# Patient Record
Sex: FEMALE | Race: WHITE | Employment: FULL TIME | URBAN - METROPOLITAN AREA
[De-identification: names, ages, dates, MRNs, and addresses within clinical notes are randomized per-mention and may not be internally consistent; named-entity substitution may affect disease eponyms.]

---

## 2018-07-18 ENCOUNTER — OFFICE VISIT (OUTPATIENT)
Dept: URGENT CARE | Age: 23
End: 2018-07-18

## 2018-07-18 VITALS
DIASTOLIC BLOOD PRESSURE: 60 MMHG | HEART RATE: 83 BPM | OXYGEN SATURATION: 98 % | WEIGHT: 191 LBS | HEIGHT: 69 IN | SYSTOLIC BLOOD PRESSURE: 118 MMHG | TEMPERATURE: 98.4 F | BODY MASS INDEX: 28.29 KG/M2 | RESPIRATION RATE: 20 BRPM

## 2018-07-18 DIAGNOSIS — R19.7 DIARRHEA, UNSPECIFIED TYPE: ICD-10-CM

## 2018-07-18 DIAGNOSIS — K58.1 IRRITABLE BOWEL SYNDROME WITH CONSTIPATION: ICD-10-CM

## 2018-07-18 DIAGNOSIS — R10.30 LOWER ABDOMINAL PAIN: Primary | ICD-10-CM

## 2018-07-18 LAB
BILIRUB UR QL STRIP: NEGATIVE
GLUCOSE UR-MCNC: NEGATIVE MG/DL
HCG URINE, QL. (POC): NEGATIVE
KETONES P FAST UR STRIP-MCNC: NEGATIVE MG/DL
PH UR STRIP: 5.5 [PH] (ref 4.6–8)
PROT UR QL STRIP: NEGATIVE
SP GR UR STRIP: 1.02 (ref 1–1.03)
UA UROBILINOGEN AMB POC: NORMAL (ref 0.2–1)
URINALYSIS CLARITY POC: NORMAL
URINALYSIS COLOR POC: NORMAL
URINE BLOOD POC: NORMAL
URINE LEUKOCYTES POC: NEGATIVE
URINE NITRITES POC: NEGATIVE
VALID INTERNAL CONTROL?: YES

## 2018-07-18 RX ORDER — LACTULOSE 10 G/15ML
10 SOLUTION ORAL; RECTAL 2 TIMES DAILY
Qty: 300 ML | Refills: 0 | Status: SHIPPED | OUTPATIENT
Start: 2018-07-18 | End: 2018-09-09

## 2018-07-18 NOTE — MR AVS SNAPSHOT
[facility] [address] 
[phone] Patient: Saúl Barker MRN: [id number] :1995 Visit Information Date & Time Provider Department Dept. Phone Encounter #  
 2018  4:30 PM [facility] [phone] [id number] Upcoming Health Maintenance Date Due  
 HPV Age 9Y-34Y (1 of 1 - Female 3 Dose Series) 2006 DTaP/Tdap/Td series (6 - Tdap) 2006 Pneumococcal 19-64 Medium Risk (1 of 1 - PPSV23) 2014 PAP AKA CERVICAL CYTOLOGY 2016 Influenza Age 5 to Adult 2018 Allergies as of 2018  Review Complete On: 2018 By: Janett Pritchard RN Severity Noted Reaction Type Reactions Tetracycline  2018    Hives Current Immunizations  Never Reviewed Name Date DTAP Vaccine 2000, 1996, 1996, 1995, 1995 HIB Vaccine 1996, 1996, 1995, 1995 Hepatitis B Vaccine 5/15/1999, 1995, 1995 IPV 2000, 1996, 1995, 1995 MMR Vaccine 2000, 1996 Meningococcal Vaccine 2009 TDAP Vaccine 1996 Varicella Virus Vaccine Live 1996 Not reviewed this visit You Were Diagnosed With   
  
 Codes Comments Lower abdominal pain    -  Primary ICD-10-CM: R10.30 ICD-9-CM: 789.09 Irritable bowel syndrome with constipation     ICD-10-CM: K58.1 ICD-9-CM: 090.5 Diarrhea, unspecified type     ICD-10-CM: R19.7 ICD-9-CM: 787.91 probably overflow, due to constipation Vitals BP Pulse Temp Resp Height(growth percentile) Weight(growth percentile)  
 118/60 83 98.4 °F (36.9 °C) 20 5' 9\" (1.753 m) 191 lb (86.6 kg) SpO2 BMI OB Status Smoking Status 98% 28.21 kg/m2 Implant Smoker, Current Status Unknown BMI and BSA Data Body Mass Index Body Surface Area  
 28.21 kg/m 2 2.05 m 2 Preferred Pharmacy Pharmacy Name Phone [facility] [phone] [address], [address] AT [place] [address] [phone] Your Updated Medication List  
  
   
This list is accurate as of 7/18/18  5:13 PM.  Always use your most recent med list.  
  
  
  
  
 lactulose 10 gram/15 mL solution Commonly known as:  Ingrid Common Take 15 mL by mouth two (2) times a day. Prescriptions Sent to Pharmacy Refills  
 lactulose (CHRONULAC) 10 gram/15 mL solution 0 Sig: Take 15 mL by mouth two (2) times a day. Class: Normal  
 Pharmacy: [facility] [address] [place] [address] [address] [facility] Ph #: [phone] Route: Oral  
  
We Performed the Following AMB POC URINALYSIS DIP STICK AUTO W/O MICRO [95318 CPT(R)] AMB POC URINE PREGNANCY TEST, VISUAL COLOR COMPARISON [36163 CPT(R)] Patient Instructions   
try magnesium Citrate 1 bottle and fleets enema tonight If not better use lactulose The use daily- 
 
High fiber diet and lots of water Use Miralax powder with Benafiber with 8 Oz of water Constipation: Care Instructions Your Care Instructions Constipation means that you have a hard time passing stools (bowel movements). People pass stools from 3 times a day to once every 3 days. What is normal for you may be different. Constipation may occur with pain in the rectum and cramping. The pain may get worse when you try to pass stools. Sometimes there are small amounts of bright red blood on toilet paper or the surface of stools. This is because of enlarged veins near the rectum (hemorrhoids). A few changes in your diet and lifestyle may help you avoid ongoing constipation. Your doctor may also prescribe medicine to help loosen your stool. Some medicines can cause constipation. These include pain medicines and antidepressants. Tell your doctor about all the medicines you take.  Your doctor may want to make a medicine change to ease your symptoms. Follow-up care is a key part of your treatment and safety. Be sure to make and go to all appointments, and call your doctor if you are having problems. It's also a good idea to know your test results and keep a list of the medicines you take. How can you care for yourself at home? · Drink plenty of fluids, enough so that your urine is light yellow or clear like water. If you have kidney, heart, or liver disease and have to limit fluids, talk with your doctor before you increase the amount of fluids you drink. · Include high-fiber foods in your diet each day. These include fruits, vegetables, beans, and whole grains. · Get at least 30 minutes of exercise on most days of the week. Walking is a good choice. You also may want to do other activities, such as running, swimming, cycling, or playing tennis or team sports. · Take a fiber supplement, such as Citrucel or Metamucil, every day. Read and follow all instructions on the label. · Schedule time each day for a bowel movement. A daily routine may help. Take your time having your bowel movement. · Support your feet with a small step stool when you sit on the toilet. This helps flex your hips and places your pelvis in a squatting position. · Your doctor may recommend an over-the-counter laxative to relieve your constipation. Examples are Milk of Magnesia and MiraLax. Read and follow all instructions on the label. Do not use laxatives on a long-term basis. When should you call for help? Call your doctor now or seek immediate medical care if: 
  · You have new or worse belly pain.  
  · You have new or worse nausea or vomiting.  
  · You have blood in your stools.  
 Watch closely for changes in your health, and be sure to contact your doctor if: 
  · Your constipation is getting worse.  
  · You do not get better as expected. Where can you learn more? Go to http://dolores-tyree.info/. Enter 21  in the search box to learn more about \"Constipation: Care Instructions. \" Current as of: November 20, 2017 Content Version: 11.7 © 6524-0453 [employer]. Care instructions adapted under license by [employer] (which disclaims liability or warranty for this information). If you have questions about a medical condition or this instruction, always ask your healthcare professional. [address] any warranty or liability for your use of this information. Introducing [facility]! Nayeli Milan introduces [employer] patient portal. Now you can access parts of your medical record, email your doctor's office, and request medication refills online. 1. In your internet browser, go to https://[email or web address]. [employer] Planet/[employer] 2. Click on the First Time User? Click Here link in the Sign In box. You will see the New Member Sign Up page. 3. Enter your [employer] Access Code exactly as it appears below. You will not need to use this code after youve completed the sign-up process. If you do not sign up before the expiration date, you must request a new code. · [employer] Access Code: [email or web address] Expires: 10/16/2018  5:13 PM 
 
4. Enter the last four digits of your Social Security Number (xxxx) and Date of Birth (mm/dd/yyyy) as indicated and click Submit. You will be taken to the next sign-up page. 5. Create a [employer] ID. This will be your [employer] login ID and cannot be changed, so think of one that is secure and easy to remember. 6. Create a [employer] password. You can change your password at any time. 7. Enter your Password Reset Question and Answer. This can be used at a later time if you forget your password. 8. Enter your e-mail address. You will receive e-mail notification when new information is available in [address] 9. Click Sign Up.  You can now view and download portions of your medical record. 10. Click the Download Summary menu link to download a portable copy of your medical information. If you have questions, please visit the Frequently Asked Questions section of the Photoblog website. Remember, Photoblog is NOT to be used for urgent needs. For medical emergencies, dial 911. Now available from your iPhone and Android! Please provide this summary of care documentation to your next provider. Your primary care clinician is listed as Maggie. If you have any questions after today's visit, please call [phone].

## 2018-07-18 NOTE — PROGRESS NOTES
Patient is a 25 y.o. female presenting with abdominal pain. Abdominal Pain    The history is provided by the patient. This is a recurrent problem. The current episode started more than 1 week ago. The problem occurs daily. The problem has not changed since onset. Associated with: no BM x  4-5 days and very hard BM. The pain is at a severity of 4/10. The pain is mild. Associated symptoms include diarrhea ( since yesterday ) and constipation. Pertinent negatives include no belching, no flatus, no hematochezia, no dysuria and no frequency. The pain is relieved by nothing. Past workup includes esophagogastroduodenoscopy. Her past medical history is significant for irritable bowel syndrome. Past Medical History:   Diagnosis Date    Acne 10/3/2011    Infectious mononucleosis 05/2010    Urticaria 04/2010        History reviewed. No pertinent surgical history. History reviewed. No pertinent family history. Social History     Social History    Marital status: SINGLE     Spouse name: N/A    Number of children: N/A    Years of education: N/A     Occupational History    Not on file. Social History Main Topics    Smoking status: Smoker, Current Status Unknown    Smokeless tobacco: Never Used    Alcohol use No    Drug use: Yes      Comment: occassional weed     Sexual activity: Yes     Other Topics Concern    Not on file     Social History Narrative                ALLERGIES: Tetracycline    Review of Systems   Gastrointestinal: Positive for abdominal pain, constipation and diarrhea ( since yesterday ). Negative for flatus and hematochezia. Genitourinary: Negative for dysuria and frequency. All other systems reviewed and are negative. Vitals:    07/18/18 1641   BP: 118/60   Pulse: 83   Resp: 20   Temp: 98.4 °F (36.9 °C)   SpO2: 98%   Weight: 191 lb (86.6 kg)   Height: 5' 9\" (1.753 m)       Physical Exam   Constitutional: No distress. HENT:   Mouth/Throat: No oropharyngeal exudate. Eyes: No scleral icterus. Abdominal: Soft. Bowel sounds are normal. She exhibits no distension and no mass. There is no tenderness. There is no rebound and no guarding. Skin: No rash noted. Nursing note and vitals reviewed. MDM    Procedures    ICD-10-CM ICD-9-CM    1. Lower abdominal pain R10.30 789.09 AMB POC URINALYSIS DIP STICK AUTO W/O MICRO      AMB POC URINE PREGNANCY TEST, VISUAL COLOR COMPARISON   2. Irritable bowel syndrome with constipation K58.1 564.1    3. Diarrhea, unspecified type R19.7 787.91     probably overflow, due to constipation        Try mag citrate and fleets enema first   High fiber diet and lots of water    Use Miralax powder with Benafiber with 8 Oz of water    Medications Ordered Today   Medications    lactulose (CHRONULAC) 10 gram/15 mL solution     Sig: Take 15 mL by mouth two (2) times a day. Dispense:  300 mL     Refill:  0     Results for orders placed or performed in visit on 07/18/18   AMB POC URINALYSIS DIP STICK AUTO W/O MICRO   Result Value Ref Range    Color (UA POC)      Clarity (UA POC)      Glucose (UA POC) Negative Negative    Bilirubin (UA POC) Negative Negative    Ketones (UA POC) Negative Negative    Specific gravity (UA POC) 1.020 1.001 - 1.035    Blood (UA POC) 2+ Negative    pH (UA POC) 5.5 4.6 - 8.0    Protein (UA POC) Negative Negative    Urobilinogen (UA POC) 0.2 mg/dL 0.2 - 1    Nitrites (UA POC) Negative Negative    Leukocyte esterase (UA POC) Negative Negative   AMB POC URINE PREGNANCY TEST, VISUAL COLOR COMPARISON   Result Value Ref Range    VALID INTERNAL CONTROL POC Yes     HCG urine, Ql. (POC) Negative Negative     The patients condition was discussed with the patient and they understand. The patient is to follow up with primary care doctor. If signs and symptoms become worse the pt is to go to the ER. The patient is to take medications as prescribed.

## 2018-07-18 NOTE — PATIENT INSTRUCTIONS
try magnesium Citrate 1 bottle and fleets enema tonight    If not better use lactulose     The use daily-    High fiber diet and lots of water  Use Miralax powder with Benafiber with 8 Oz of water     Constipation: Care Instructions  Your Care Instructions    Constipation means that you have a hard time passing stools (bowel movements). People pass stools from 3 times a day to once every 3 days. What is normal for you may be different. Constipation may occur with pain in the rectum and cramping. The pain may get worse when you try to pass stools. Sometimes there are small amounts of bright red blood on toilet paper or the surface of stools. This is because of enlarged veins near the rectum (hemorrhoids). A few changes in your diet and lifestyle may help you avoid ongoing constipation. Your doctor may also prescribe medicine to help loosen your stool. Some medicines can cause constipation. These include pain medicines and antidepressants. Tell your doctor about all the medicines you take. Your doctor may want to make a medicine change to ease your symptoms. Follow-up care is a key part of your treatment and safety. Be sure to make and go to all appointments, and call your doctor if you are having problems. It's also a good idea to know your test results and keep a list of the medicines you take. How can you care for yourself at home? · Drink plenty of fluids, enough so that your urine is light yellow or clear like water. If you have kidney, heart, or liver disease and have to limit fluids, talk with your doctor before you increase the amount of fluids you drink. · Include high-fiber foods in your diet each day. These include fruits, vegetables, beans, and whole grains. · Get at least 30 minutes of exercise on most days of the week. Walking is a good choice. You also may want to do other activities, such as running, swimming, cycling, or playing tennis or team sports.   · Take a fiber supplement, such as Citrucel or Metamucil, every day. Read and follow all instructions on the label. · Schedule time each day for a bowel movement. A daily routine may help. Take your time having your bowel movement. · Support your feet with a small step stool when you sit on the toilet. This helps flex your hips and places your pelvis in a squatting position. · Your doctor may recommend an over-the-counter laxative to relieve your constipation. Examples are Milk of Magnesia and MiraLax. Read and follow all instructions on the label. Do not use laxatives on a long-term basis. When should you call for help? Call your doctor now or seek immediate medical care if:    · You have new or worse belly pain.     · You have new or worse nausea or vomiting.     · You have blood in your stools.    Watch closely for changes in your health, and be sure to contact your doctor if:    · Your constipation is getting worse.     · You do not get better as expected. Where can you learn more? Go to http://dolores-tyree.info/. Enter 21 [phone] in the search box to learn more about \"Constipation: Care Instructions. \"  Current as of: November 20, 2017  Content Version: 11.7  © 3324-2660 [employer]. Care instructions adapted under license by [employer] (which disclaims liability or warranty for this information). If you have questions about a medical condition or this instruction, always ask your healthcare professional. [place] [address] any warranty or liability for your use of this information.

## 2018-09-09 ENCOUNTER — APPOINTMENT (OUTPATIENT)
Dept: CT IMAGING | Age: 23
End: 2018-09-09
Attending: PHYSICIAN ASSISTANT
Payer: SELF-PAY

## 2018-09-09 ENCOUNTER — OFFICE VISIT (OUTPATIENT)
Dept: URGENT CARE | Age: 23
End: 2018-09-09

## 2018-09-09 ENCOUNTER — HOSPITAL ENCOUNTER (EMERGENCY)
Age: 23
Discharge: HOME | End: 2018-09-09
Attending: EMERGENCY MEDICINE
Payer: SELF-PAY

## 2018-09-09 VITALS
HEIGHT: 69 IN | WEIGHT: 193.1 LBS | OXYGEN SATURATION: 99 % | RESPIRATION RATE: 16 BRPM | SYSTOLIC BLOOD PRESSURE: 137 MMHG | BODY MASS INDEX: 28.6 KG/M2 | DIASTOLIC BLOOD PRESSURE: 85 MMHG | HEART RATE: 86 BPM

## 2018-09-09 VITALS
HEIGHT: 69 IN | WEIGHT: 192.46 LBS | OXYGEN SATURATION: 100 % | TEMPERATURE: 98.3 F | DIASTOLIC BLOOD PRESSURE: 78 MMHG | HEART RATE: 93 BPM | RESPIRATION RATE: 16 BRPM | BODY MASS INDEX: 28.51 KG/M2 | SYSTOLIC BLOOD PRESSURE: 154 MMHG
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
## 2018-09-09 DIAGNOSIS — G44.209 TENSION HEADACHE: Primary | ICD-10-CM

## 2018-09-09 PROCEDURE — 99282 EMERGENCY DEPT VISIT SF MDM: CPT

## 2018-09-09 PROCEDURE — 70450 CT HEAD/BRAIN W/O DYE: CPT

## 2018-09-09 RX ORDER — IBUPROFEN 800 MG/1
800 TABLET ORAL
Qty: 20 TAB | Refills: 0 | Status: SHIPPED | OUTPATIENT
Start: 2018-09-09 | End: 2018-09-16

## 2018-09-09 RX ORDER — HYDROCODONE BITARTRATE AND ACETAMINOPHEN 5; 325 MG/1; MG/1
1 TABLET ORAL
Qty: 10 TAB | Refills: 0 | Status: SHIPPED | OUTPATIENT
Start: 2018-09-09 | End: 2018-12-06

## 2018-09-09 RX ORDER — CYCLOBENZAPRINE HCL 10 MG
10 TABLET ORAL
Qty: 20 TAB | Refills: 0 | Status: SHIPPED | OUTPATIENT
Start: 2018-09-09 | End: 2018-12-06

## 2018-09-09 NOTE — LETTER
[address] 
[facility] EMERGENCY DEPT 
[address] [facility] [address] [id number] [phone] Work/School Note Date: 9/9/2018 To Whom It May concern: 
 
Miladis Andrade was seen and treated today in the emergency room by the following provider(s): 
Attending Provider: Joseph Garcia MD 
Physician Assistant: NORMA Pal. Miladis Andrade may return to work on [id number]. Sincerely, NORMA Pal
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
## 2018-09-10 NOTE — DISCHARGE INSTRUCTIONS
[facility] SYSTEMS Departments     For adult and child immunizations, family planning, TB screening, STD testing and women's health services. [facility]: [place] [phone]      [facility] 25   [address]   [address]   170 [facility]: Roberto Villasenor [address] [phone]      2400 [facility]          [address] [address]     For primary care services, woman and child wellness, and some clinics providing specialty care. [facility] -- [address] 2525 [facility] [phone]   411 [facility] 200 [facility] 3614 [facility] [phone]   339 [facility] [address] [phone] [address]  [employer] [address] [place] [address] 5850  Community  [phone]   [address] [place] [address] [address] [place] [phone]   [facility] 81 [facility] [phone]   [address] [phone]   [facility]: [facility] 700 [address], ext [address] [place] [address], [address] [phone]     [facility] [address] [facility] [address] [phone]   [facility] Outreach 5850 [facility]  [phone]   [employer]  [address], [address] ([email or web address]) [phone]         Sexual Health/Woman Wellness Clinics    For STD/HIV testing and treatment, pregnancy testing and services, men's health, birth control services, LGBT services, and hepatitis/HPV vaccine services. [facility] [employer] [facility] for [employer] 201 [address] [facility] [address] [facility] [address] [facility] 1579 600 E[address]rem[address] [phone]   [facility] [address], 5th floor [phone]   Pregnancy [address] [place] [address] for [facility] M[facility] [phone]         Specialty Service 170 [facility]   [phone]   [place]   [phone]   Women, Infant and Children's Services: [address] [phone]       600 [facility]   [phone]   [address] 9422 [facility]     [phone]   [address] Crisis   [address] [facility] [address] [phone]     Local Primary Care Physicians  [facility] [phone]  MD Elizabeth Wells, MD Rosangela Nelson MD [facility] Doctors [phone]  Milly Caputo, P  Lucy Signs, MD Elvis Chavez, MD Raquel Levine [place] [address] [phone]  MD Jonas Barry MD [address] [facility] [phone]  MD Tariq Berrios, MD Malorie Olvera, MD Azeem Wallace MD   [facility] [phone]  HYRAIN CYR, MD Papito Canseco, NP [address] [phone]  [facility], MD Debra Hopper MD Latricia King, MD Milissa Mohair, MD Vertis Board, MD Tony Mac MD   33 57 [facility]  Yayo Vivar MD [address] [phone]  MD Jose Tatum, NP  Melanie Morales, MD Anette Cassidy MD Dionicia Lights, MD Rosabel Holding, MD   7605 [facility] [phone]  Kt Jordan, MD Levi Willis, P  Wero Earl, LYNN Guillaume, MD Newton Hughes, MD Katerin Mireles MD EPHRAIM [facility] [phone]  Kristy Santana, MD Pepe Hickman MD Constantine Ruffing, MD Janette Leroy MD   [address] [phone]  MD Skye Miller MD [place] [phone]  Latrice Jamison, MD Hany Kat Elaina Greer, [address] [facility] [phone]  MD Opal Mcdonald, MD Abhishek George, MD Suzanne Cuenca, MD Dirk Schaffer, LYNN Hubbard MD 1619 Critical access hospital   [phone]  MD Kike Saha, MD Maribell Lester MD   2102 [facility] [phone]  MD Graciela Nolan, [facility], PA-C  [facility]  Marilyn Brown, PA-C  MD Denisse Dunn, LYNN Ortiz, DO Miscellaneous:  Melissa Moreno -[phone]

## 2018-09-10 NOTE — ED NOTES
Manjit Constantino has reviewed discharge instructions with the patient. The patient verbalized understanding. Pt. A&Ox4, respirations even and unlabored. VS stable as noted in flowsheet. Pt Declined wheelchair assist from department; paperwork in hand.

## 2018-12-06 ENCOUNTER — OFFICE VISIT (OUTPATIENT)
Dept: URGENT CARE | Age: 23
End: 2018-12-06

## 2018-12-06 VITALS
OXYGEN SATURATION: 95 % | HEIGHT: 69 IN | HEART RATE: 76 BPM | RESPIRATION RATE: 16 BRPM | SYSTOLIC BLOOD PRESSURE: 136 MMHG | TEMPERATURE: 97.6 F | WEIGHT: 197 LBS | DIASTOLIC BLOOD PRESSURE: 75 MMHG | BODY MASS INDEX: 29.18 KG/M2

## 2018-12-06 DIAGNOSIS — J40 BRONCHITIS: Primary | ICD-10-CM

## 2018-12-06 DIAGNOSIS — R05.9 COUGH: ICD-10-CM

## 2018-12-06 RX ORDER — PREDNISONE 5 MG/1
TABLET ORAL
Qty: 21 TAB | Refills: 0 | Status: SHIPPED | OUTPATIENT
Start: 2018-12-06 | End: 2019-01-22

## 2018-12-06 RX ORDER — AZITHROMYCIN 250 MG/1
TABLET, FILM COATED ORAL
Qty: 6 TAB | Refills: 0 | Status: SHIPPED | OUTPATIENT
Start: 2018-12-06 | End: 2019-01-22

## 2018-12-06 RX ORDER — BENZONATATE 200 MG/1
200 CAPSULE ORAL
Qty: 30 CAP | Refills: 0 | Status: SHIPPED | OUTPATIENT
Start: 2018-12-06 | End: 2019-01-22

## 2018-12-06 NOTE — PATIENT INSTRUCTIONS
Bronchitis: Care Instructions  Your Care Instructions    Bronchitis is inflammation of the bronchial tubes, which carry air to the lungs. The tubes swell and produce mucus, or phlegm. The mucus and inflamed bronchial tubes make you cough. You may have trouble breathing. Most cases of bronchitis are caused by viruses like those that cause colds. Antibiotics usually do not help and they may be harmful. Bronchitis usually develops rapidly and lasts about 2 to 3 weeks in otherwise healthy people. Follow-up care is a key part of your treatment and safety. Be sure to make and go to all appointments, and call your doctor if you are having problems. It's also a good idea to know your test results and keep a list of the medicines you take. How can you care for yourself at home? · Take all medicines exactly as prescribed. Call your doctor if you think you are having a problem with your medicine. · Get some extra rest.  · Take an over-the-counter pain medicine, such as acetaminophen (Tylenol), ibuprofen (Advil, Motrin), or naproxen (Aleve) to reduce fever and relieve body aches. Read and follow all instructions on the label. · Do not take two or more pain medicines at the same time unless the doctor told you to. Many pain medicines have acetaminophen, which is Tylenol. Too much acetaminophen (Tylenol) can be harmful. · Take an over-the-counter cough medicine that contains dextromethorphan to help quiet a dry, hacking cough so that you can sleep. Avoid cough medicines that have more than one active ingredient. Read and follow all instructions on the label. · Breathe moist air from a humidifier, hot shower, or sink filled with hot water. The heat and moisture will thin mucus so you can cough it out. · Do not smoke. Smoking can make bronchitis worse. If you need help quitting, talk to your doctor about stop-smoking programs and medicines. These can increase your chances of quitting for good.   When should you call for help? Call 911 anytime you think you may need emergency care. For example, call if:    · You have severe trouble breathing.    Call your doctor now or seek immediate medical care if:    · You have new or worse trouble breathing.     · You cough up dark brown or bloody mucus (sputum).     · You have a new or higher fever.     · You have a new rash.    Watch closely for changes in your health, and be sure to contact your doctor if:    · You cough more deeply or more often, especially if you notice more mucus or a change in the color of your mucus.     · You are not getting better as expected. Where can you learn more? Go to http://dolores-tyree.info/. Enter H333 in the search box to learn more about \"Bronchitis: Care Instructions. \"  Current as of: December 6, 2017  Content Version: 11.8  © 3345-8944 [employer]. Care instructions adapted under license by [employer] (which disclaims liability or warranty for this information). If you have questions about a medical condition or this instruction, always ask your healthcare professional. [address] any warranty or liability for your use of this information.

## 2018-12-06 NOTE — PROGRESS NOTES
Cold Symptoms   The history is provided by the patient. This is a new problem. Episode onset: 2 weeks ago. The problem occurs constantly. The problem has been gradually worsening. The cough is non-productive. There has been no fever. Associated symptoms include ear congestion and rhinorrhea. Pertinent negatives include no chest pain, no chills, no sweats, no ear pain, no headaches, no sore throat, no myalgias, no shortness of breath, no wheezing, no nausea and no vomiting. She is a smoker. Her past medical history is significant for asthma. Past Medical History:   Diagnosis Date    Acne 10/3/2011    Infectious mononucleosis 05/2010    Urticaria 04/2010        History reviewed. No pertinent surgical history. History reviewed. No pertinent family history. Social History     Socioeconomic History    Marital status: SINGLE     Spouse name: Not on file    Number of children: Not on file    Years of education: Not on file    Highest education level: Not on file   Social Needs    Financial resource strain: Not on file    Food insecurity - worry: Not on file    Food insecurity - inability: Not on file    Transportation needs - medical: Not on file   [employer] needs - non-medical: Not on file   Occupational History    Not on file   Tobacco Use    Smoking status: Smoker, Current Status Unknown    Smokeless tobacco: Never Used   Substance and Sexual Activity    Alcohol use: No    Drug use: Yes     Comment: occassional weed     Sexual activity: Yes   Other Topics Concern    Not on file   Social History Narrative    Not on file                ALLERGIES: Tetracycline    Review of Systems   Constitutional: Negative for activity change, appetite change, chills and fever. HENT: Positive for congestion and rhinorrhea. Negative for ear pain, sinus pressure, sinus pain, sore throat and trouble swallowing. Respiratory: Positive for cough. Negative for shortness of breath and wheezing. Cardiovascular: Negative for chest pain and palpitations. Gastrointestinal: Negative for nausea and vomiting. Musculoskeletal: Negative for myalgias. Neurological: Negative for dizziness and headaches. Hematological: Negative for adenopathy. Vitals:    12/06/18 0922   BP: 136/75   Pulse: 76   Resp: 16   Temp: 97.6 °F (36.4 °C)   SpO2: 95%   Weight: 197 lb (89.4 kg)   Height: 5' 9\" (1.753 m)       Physical Exam   Constitutional: She appears well-developed and well-nourished. No distress. HENT:   Right Ear: Tympanic membrane, external ear and ear canal normal.   Left Ear: Tympanic membrane, external ear and ear canal normal.   Nose: Rhinorrhea present. Right sinus exhibits no maxillary sinus tenderness and no frontal sinus tenderness. Left sinus exhibits no maxillary sinus tenderness and no frontal sinus tenderness. Mouth/Throat: Oropharynx is clear and moist and mucous membranes are normal. No oropharyngeal exudate, posterior oropharyngeal edema, posterior oropharyngeal erythema or tonsillar abscesses. Cardiovascular: Normal rate, regular rhythm and normal heart sounds. Pulmonary/Chest: Effort normal and breath sounds normal. No respiratory distress. She has no wheezes. She has no rales. Lymphadenopathy:     She has no cervical adenopathy. Neurological: She is alert. Skin: She is not diaphoretic. Psychiatric: She has a normal mood and affect. Her behavior is normal. Judgment and thought content normal.   Nursing note and vitals reviewed. [facility]    ICD-10-CM ICD-9-CM    1. Bronchitis J40 490    2. Cough R05 786.2 XR CHEST PA LAT     Medications Ordered Today   Medications    azithromycin (ZITHROMAX) 250 mg tablet     Sig: Take two tablets today then one tablet daily     Dispense:  6 Tab     Refill:  0    benzonatate (TESSALON) 200 mg capsule     Sig: Take 1 Cap by mouth three (3) times daily as needed for Cough.      Dispense:  30 Cap     Refill:  0    predniSONE (STERAPRED) 5 mg dose pack     Sig: See administration instruction per 5mg dose pack     Dispense:  21 Tab     Refill:  0     The patients condition was discussed with the patient and they understand. The patient is to follow up with PCP [facility]. If signs and symptoms become worse the pt is to go to the ER. The patient is to take medications as prescribed. XR Results (most recent):  Results from Appointment encounter on 12/06/18   XR CHEST PA LAT    Narrative Indication: Cough for 2 weeks. Exam: PA and lateral views of the chest.    There is no prior study for direct comparison. Findings: Cardiomediastinal silhouette is within normal limits. Lungs are clear  bilaterally. Pleural spaces are normal. Osseous structures are intact. Impression IMPRESSION: No acute cardiopulmonary disease.              Procedures

## 2019-01-22 ENCOUNTER — OFFICE VISIT (OUTPATIENT)
Dept: URGENT CARE | Age: 24
End: 2019-01-22

## 2019-01-22 VITALS
HEIGHT: 69 IN | RESPIRATION RATE: 16 BRPM | DIASTOLIC BLOOD PRESSURE: 56 MMHG | TEMPERATURE: 98.6 F | SYSTOLIC BLOOD PRESSURE: 114 MMHG | HEART RATE: 93 BPM | WEIGHT: 202 LBS | OXYGEN SATURATION: 98 % | BODY MASS INDEX: 29.92 KG/M2

## 2019-01-22 DIAGNOSIS — J06.9 UPPER RESPIRATORY TRACT INFECTION, UNSPECIFIED TYPE: Primary | ICD-10-CM

## 2019-01-22 DIAGNOSIS — J02.9 SORE THROAT: ICD-10-CM

## 2019-01-22 LAB
FLUAV+FLUBV AG NOSE QL IA.RAPID: NEGATIVE POS/NEG
FLUAV+FLUBV AG NOSE QL IA.RAPID: NEGATIVE POS/NEG
S PYO AG THROAT QL: NEGATIVE
VALID INTERNAL CONTROL?: YES
VALID INTERNAL CONTROL?: YES

## 2019-01-22 NOTE — PROGRESS NOTES
Cold Symptoms   The history is provided by the patient. This is a new problem. The current episode started yesterday. The problem occurs constantly. The problem has not changed since onset. The cough is non-productive. There has been no fever. Associated symptoms include chills, sweats, rhinorrhea, sore throat and myalgias. Pertinent negatives include no chest pain, no ear congestion, no ear pain, no headaches, no shortness of breath, no wheezing, no nausea and no vomiting. She is a smoker. Past Medical History:   Diagnosis Date    Acne 10/3/2011    Infectious mononucleosis 05/2010    Urticaria 04/2010        History reviewed. No pertinent surgical history. History reviewed. No pertinent family history. Social History     Socioeconomic History    Marital status: SINGLE     Spouse name: Not on file    Number of children: Not on file    Years of education: Not on file    Highest education level: Not on file   Social Needs    Financial resource strain: Not on file    Food insecurity - worry: Not on file    Food insecurity - inability: Not on file    Transportation needs - medical: Not on file   [employer] needs - non-medical: Not on file   Occupational History    Not on file   Tobacco Use    Smoking status: Smoker, Current Status Unknown    Smokeless tobacco: Never Used   Substance and Sexual Activity    Alcohol use: No    Drug use: Yes     Comment: occassional weed     Sexual activity: Yes   Other Topics Concern    Not on file   Social History Narrative    Not on file                ALLERGIES: Tetracycline    Review of Systems   Constitutional: Positive for chills. Negative for activity change, appetite change and fever. HENT: Positive for congestion, rhinorrhea and sore throat. Negative for ear pain, sinus pressure, sinus pain and trouble swallowing. Respiratory: Positive for cough. Negative for shortness of breath and wheezing.     Cardiovascular: Negative for chest pain and palpitations. Gastrointestinal: Negative for nausea and vomiting. Musculoskeletal: Positive for myalgias. Neurological: Negative for dizziness and headaches. Hematological: Negative for adenopathy. Vitals:    01/22/19 0850   BP: 114/56   Pulse: 93   Resp: 16   Temp: 98.6 °F (37 °C)   SpO2: 98%   Weight: 202 lb (91.6 kg)   Height: 5' 9\" (1.753 m)       Physical Exam   Constitutional: She appears well-developed and well-nourished. No distress. HENT:   Right Ear: Tympanic membrane, external ear and ear canal normal.   Left Ear: Tympanic membrane, external ear and ear canal normal.   Nose: Rhinorrhea present. Right sinus exhibits no maxillary sinus tenderness and no frontal sinus tenderness. Left sinus exhibits no maxillary sinus tenderness and no frontal sinus tenderness. Mouth/Throat: Oropharynx is clear and moist and mucous membranes are normal. No oropharyngeal exudate, posterior oropharyngeal edema, posterior oropharyngeal erythema or tonsillar abscesses. Cardiovascular: Normal rate, regular rhythm and normal heart sounds. Pulmonary/Chest: Effort normal and breath sounds normal. No respiratory distress. She has no wheezes. She has no rales. Lymphadenopathy:     She has no cervical adenopathy. Neurological: She is alert. Skin: She is not diaphoretic. Psychiatric: She has a normal mood and affect. Her behavior is normal. Judgment and thought content normal.   Nursing note and vitals reviewed. MDM    ICD-10-CM ICD-9-CM    1. Upper respiratory tract infection, unspecified type J06.9 465.9    2. Sore throat J02.9 462 AMB POC DEREK INFLUENZA A/B TEST      AMB POC RAPID STREP A      UPPER RESPIRATORY CULTURE     Fluids  Tylenol/motrin prn  Rest    The patients condition was discussed with the patient and they understand. The patient is to follow up with PCP [facility]. If signs and symptoms become worse the pt is to go to the ER. The patient is to take medications as prescribed. Results for orders placed or performed in visit on 01/22/19   AMB POC DEREK INFLUENZA A/B TEST   Result Value Ref Range    VALID INTERNAL CONTROL POC Yes     Influenza A Ag POC Negative Negative Pos/Neg    Influenza B Ag POC Negative Negative Pos/Neg   AMB POC RAPID STREP A   Result Value Ref Range    VALID INTERNAL CONTROL POC Yes     Group A Strep Ag Negative Negative         Procedures

## 2019-01-22 NOTE — PATIENT INSTRUCTIONS
Viral Respiratory Infection: Care Instructions  Your Care Instructions    Viruses are very small organisms. They grow in number after they enter your body. There are many types that cause different illnesses, such as colds and the mumps. The symptoms of a viral respiratory infection often start quickly. They include a fever, sore throat, and runny nose. You may also just not feel well. Or you may not want to eat much. Most viral respiratory infections are not serious. They usually get better with time and self-care. Antibiotics are not used to treat a viral infection. That's because antibiotics will not help cure a viral illness. In some cases, antiviral medicine can help your body fight a serious viral infection. Follow-up care is a key part of your treatment and safety. Be sure to make and go to all appointments, and call your doctor if you are having problems. It's also a good idea to know your test results and keep a list of the medicines you take. How can you care for yourself at home? · Rest as much as possible until you feel better. · Be safe with medicines. Take your medicine exactly as prescribed. Call your doctor if you think you are having a problem with your medicine. You will get more details on the specific medicine your doctor prescribes. · Take an over-the-counter pain medicine, such as acetaminophen (Tylenol), ibuprofen (Advil, Motrin), or naproxen (Aleve), as needed for pain and fever. Read and follow all instructions on the label. Do not give aspirin to anyone younger than 20. It has been linked to Reye syndrome, a serious illness. · Drink plenty of fluids, enough so that your urine is light yellow or clear like water. Hot fluids, such as tea or soup, may help relieve congestion in your nose and throat. If you have kidney, heart, or liver disease and have to limit fluids, talk with your doctor before you increase the amount of fluids you drink.   · Try to clear mucus from your lungs by breathing deeply and coughing. · Gargle with warm salt water once an hour. This can help reduce swelling and throat pain. Use 1 teaspoon of salt mixed in 1 cup of warm water. · Do not smoke or allow others to smoke around you. If you need help quitting, talk to your doctor about stop-smoking programs and medicines. These can increase your chances of quitting for good. To avoid spreading the virus  · Cough or sneeze into a tissue. Then throw the tissue away. · If you don't have a tissue, use your hand to cover your cough or sneeze. Then clean your hand. You can also cough into your sleeve. · Wash your hands often. Use soap and warm water. Wash for 15 to 20 seconds each time. · If you don't have soap and water near you, you can clean your hands with alcohol wipes or gel. When should you call for help? Call your doctor now or seek immediate medical care if:    · You have a new or higher fever.     · Your fever lasts more than 48 hours.     · You have trouble breathing.     · You have a fever with a stiff neck or a severe headache.     · You are sensitive to light.     · You feel very sleepy or confused.    Watch closely for changes in your health, and be sure to contact your doctor if:    · You do not get better as expected. Where can you learn more? Go to http://dolores-tyree.info/. Enter U341 in the search box to learn more about \"Viral Respiratory Infection: Care Instructions. \"  Current as of: September 5, 2018  Content Version: 11.9  © 9963-5830 [employer]. Care instructions adapted under license by [employer] (which disclaims liability or warranty for this information). If you have questions about a medical condition or this instruction, always ask your healthcare professional. [address] any warranty or liability for your use of this information.

## 2019-01-24 LAB — BACTERIA SPEC RESP CULT: NORMAL

## 2019-01-26 ENCOUNTER — HOSPITAL ENCOUNTER (EMERGENCY)
Age: 24
Discharge: HOME | End: 2019-01-26
Attending: EMERGENCY MEDICINE
Payer: COMMERCIAL

## 2019-01-26 VITALS
HEART RATE: 88 BPM | BODY MASS INDEX: 30.11 KG/M2 | HEIGHT: 69 IN | SYSTOLIC BLOOD PRESSURE: 131 MMHG | RESPIRATION RATE: 16 BRPM | OXYGEN SATURATION: 100 % | WEIGHT: 203.26 LBS | DIASTOLIC BLOOD PRESSURE: 69 MMHG | TEMPERATURE: 97.5 F

## 2019-01-26 DIAGNOSIS — L03.115 CELLULITIS OF RIGHT LOWER EXTREMITY: Primary | ICD-10-CM

## 2019-01-26 DIAGNOSIS — J02.9 PHARYNGITIS, UNSPECIFIED ETIOLOGY: ICD-10-CM

## 2019-01-26 LAB
BASOPHILS # BLD: 0.1 K/UL (ref 0–0.1)
BASOPHILS NFR BLD: 1 % (ref 0–1)
DIFFERENTIAL METHOD BLD: ABNORMAL
EOSINOPHIL # BLD: 0.2 K/UL (ref 0–0.4)
EOSINOPHIL NFR BLD: 2 % (ref 0–7)
ERYTHROCYTE [DISTWIDTH] IN BLOOD BY AUTOMATED COUNT: 11.7 % (ref 11.5–14.5)
HCT VFR BLD AUTO: 40.3 % (ref 35–47)
HGB BLD-MCNC: 13.7 G/DL (ref 11.5–16)
IMM GRANULOCYTES # BLD AUTO: 0 K/UL (ref 0–0.04)
IMM GRANULOCYTES NFR BLD AUTO: 0 % (ref 0–0.5)
LYMPHOCYTES # BLD: 3.8 K/UL (ref 0.8–3.5)
LYMPHOCYTES NFR BLD: 36 % (ref 12–49)
MCH RBC QN AUTO: 31.7 PG (ref 26–34)
MCHC RBC AUTO-ENTMCNC: 34 G/DL (ref 30–36.5)
MCV RBC AUTO: 93.3 FL (ref 80–99)
MONOCYTES # BLD: 0.8 K/UL (ref 0–1)
MONOCYTES NFR BLD: 8 % (ref 5–13)
NEUTS BAND NFR BLD MANUAL: 2 %
NEUTS SEG # BLD: 5.6 K/UL (ref 1.8–8)
NEUTS SEG NFR BLD: 51 % (ref 32–75)
NRBC # BLD: 0 K/UL (ref 0–0.01)
NRBC BLD-RTO: 0 PER 100 WBC
PLATELET # BLD AUTO: 252 K/UL (ref 150–400)
PMV BLD AUTO: 10.7 FL (ref 8.9–12.9)
RBC # BLD AUTO: 4.32 M/UL (ref 3.8–5.2)
RBC MORPH BLD: ABNORMAL
WBC # BLD AUTO: 10.5 K/UL (ref 3.6–11)
WBC MORPH BLD: ABNORMAL

## 2019-01-26 PROCEDURE — 87040 BLOOD CULTURE FOR BACTERIA: CPT

## 2019-01-26 PROCEDURE — 99283 EMERGENCY DEPT VISIT LOW MDM: CPT

## 2019-01-26 PROCEDURE — 85025 COMPLETE CBC W/AUTO DIFF WBC: CPT

## 2019-01-26 PROCEDURE — 36415 COLL VENOUS BLD VENIPUNCTURE: CPT

## 2019-01-26 RX ORDER — CEPHALEXIN 500 MG/1
500 CAPSULE ORAL 4 TIMES DAILY
Qty: 28 CAP | Refills: 0 | Status: SHIPPED | OUTPATIENT
Start: 2019-01-26 | End: 2019-02-02

## 2019-01-26 RX ORDER — LANOLIN ALCOHOL/MO/W.PET/CERES
3 CREAM (GRAM) TOPICAL
Qty: 30 TAB | Refills: 0 | Status: SHIPPED | OUTPATIENT
Start: 2019-01-26 | End: 2021-07-07

## 2019-01-26 RX ORDER — SULFAMETHOXAZOLE AND TRIMETHOPRIM 800; 160 MG/1; MG/1
1 TABLET ORAL 2 TIMES DAILY
Qty: 14 TAB | Refills: 0 | Status: SHIPPED | OUTPATIENT
Start: 2019-01-26 | End: 2019-02-02

## 2019-01-26 RX ORDER — IBUPROFEN 600 MG/1
600 TABLET ORAL
Qty: 20 TAB | Refills: 0 | Status: SHIPPED | OUTPATIENT
Start: 2019-01-26 | End: 2019-05-13

## 2019-01-26 NOTE — ED PROVIDER NOTES
EMERGENCY DEPARTMENT HISTORY AND PHYSICAL EXAM 
 
 
Date: 1/26/2019 Patient Name: Serena Smith History of Presenting Illness Chief Complaint Patient presents with  Sore Throat Ambulatory into the ED with c/o sore throat x several days. Evaluated at [facility] on 1/22-reports negative flu swab. Pt appears ill, but non-toxic.  Skin Problem Redness, swelling and warmth to tattoo on the back of her Rt lower leg (tattoo placed on 1/18). History Provided By: Patient HPI: Serena Smith, 21 y.o. female with PMHx significant for mononucleosis, presents ambulatory to the ED with cc of a sudden onset of flu like symptoms since 1/21/19. She reports associated symptoms of generalized body aches, chills, sore throat, and cough. Pt states she had went home from work on 1/21 and had experienced her flu like symptoms once getting out of the shower. She was seen at [facility] the following day and states they had perform a strep and flu swab, which were both negative. They have sent off a throat culture as well. She denies being rx'd any steroids. Pt additionally, states she had gotten a tattoo on her R calf on 1/18 and believes it had become infected. She states the area is red, swollen, and has an increase in warmth. 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 She denies any recent dog bites in the area. There are no other complaints, changes, or physical findings at this time. PCP: None No current facility-administered medications on file prior to encounter. Current Outpatient Medications on File Prior to Encounter Medication Sig Dispense Refill  etonogestrel (NEXPLANON) 68 mg impl by SubDERmal route. Past History Past Medical History: 
Past Medical History:  
Diagnosis Date  Acne 10/3/2011  Infectious mononucleosis 05/2010  Urticaria 04/2010 Past Surgical History: No past surgical history on file. Family History: No family history on file. Social History: 
Social History Tobacco Use  Smoking status: Smoker, Current Status Unknown  Smokeless tobacco: Never Used Substance Use Topics  Alcohol use: No  
 Drug use: Yes Comment: occassional weed Allergies: Allergies Allergen Reactions  Tetracycline Hives Review of Systems Review of Systems Constitutional: Positive for chills. Negative for fever. HENT: Positive for sore throat. Negative for congestion. Eyes: Negative for visual disturbance. Respiratory: Positive for cough. Negative for shortness of breath. Cardiovascular: Negative for chest pain and leg swelling. Gastrointestinal: Negative for abdominal pain, blood in stool, diarrhea and nausea. Endocrine: Negative for polyuria. Genitourinary: Negative for dysuria, flank pain, vaginal bleeding and vaginal discharge. Musculoskeletal: Positive for myalgias (+generalized body aches). +R calf swelling Skin: Negative for rash. +R calf redness and warmth Allergic/Immunologic: Negative for immunocompromised state. Neurological: Negative for weakness and headaches. Psychiatric/Behavioral: Negative for confusion. Physical Exam  
Physical Exam  
Constitutional: She is oriented to person, place, and time. She appears well-developed and well-nourished. HENT:  
Head: Normocephalic and atraumatic. Moist mucous membranes, enlarged tonsils, some exudate, uvula midline, no evidence of PTA Eyes: Conjunctivae are normal. Pupils are equal, round, and reactive to light. Right eye exhibits no discharge. Left eye exhibits no discharge. Neck: Normal range of motion. Neck supple. No tracheal deviation present. Cardiovascular: Normal rate, regular rhythm and normal heart sounds. No murmur heard.  
Pulmonary/Chest: Effort normal and breath sounds normal. No respiratory distress. She has no wheezes. She has no rales. Abdominal: Soft. Bowel sounds are normal. There is no tenderness. There is no rebound and no guarding. Musculoskeletal: Normal range of motion. She exhibits no edema, tenderness or deformity. Neurological: She is alert and oriented to person, place, and time. Skin: Skin is warm and dry. No rash noted. No erythema. Erythema R posterior calf extending around her recent new tattoo, no abscess palpable, no crepitus, consistent with cellulitis Psychiatric: Her behavior is normal.  
Nursing note and vitals reviewed. Diagnostic Study Results Labs - Recent Results (from the past 12 hour(s)) CBC WITH AUTOMATED DIFF Collection Time: 01/26/19  5:56 PM  
Result Value Ref Range WBC 10.5 3.6 - 11.0 K/uL  
 RBC 4.32 3.80 - 5.20 M/uL  
 HGB 13.7 11.5 - 16.0 g/dL HCT 40.3 35.0 - 47.0 % MCV 93.3 80.0 - 99.0 FL  
 MCH 31.7 26.0 - 34.0 PG  
 MCHC 34.0 30.0 - 36.5 g/dL  
 RDW 11.7 11.5 - 14.5 % PLATELET 577 243 - 599 K/uL MPV 10.7 8.9 - 12.9 FL  
 NRBC 0.0 0  WBC ABSOLUTE NRBC 0.00 0.00 - 0.01 K/uL NEUTROPHILS 51 32 - 75 % BAND NEUTROPHILS 2 % LYMPHOCYTES 36 12 - 49 % MONOCYTES 8 5 - 13 % EOSINOPHILS 2 0 - 7 % BASOPHILS 1 0 - 1 % IMMATURE GRANULOCYTES 0 0.0 - 0.5 % ABS. NEUTROPHILS 5.6 1.8 - 8.0 K/UL  
 ABS. LYMPHOCYTES 3.8 (H) 0.8 - 3.5 K/UL  
 ABS. MONOCYTES 0.8 0.0 - 1.0 K/UL  
 ABS. EOSINOPHILS 0.2 0.0 - 0.4 K/UL  
 ABS. BASOPHILS 0.1 0.0 - 0.1 K/UL  
 ABS. IMM. GRANS. 0.0 0.00 - 0.04 K/UL  
 DF MANUAL    
 RBC COMMENTS NORMOCYTIC, NORMOCHROMIC    
 WBC COMMENTS REACTIVE LYMPHS Radiologic Studies - No orders to display Medical Decision Making I am the first provider for this patient. I reviewed the vital signs, available nursing notes, past medical history, past surgical history, family history and social history. Vital Signs-Reviewed the patient's vital signs. Patient Vitals for the past 12 hrs: 
 Temp Pulse Resp BP SpO2  
01/26/19 1834  88 16 131/69 100 % 01/26/19 1731 97.5 °F (36.4 °C) 90 16 (!) 163/93 100 % Pulse Oximetry Analysis - 100% on RA Cardiac Monitor:  
Rate: 87 bpm 
Rhythm: Normal Sinus Rhythm Records Reviewed: Nursing Notes, Old Medical Records, Previous Radiology Studies and Previous Laboratory Studies Provider Notes (Medical Decision Making): DDx: Cellulitis and likely viral pharyngitis. ED Course:  
Initial assessment performed. The patients presenting problems have been discussed, and they are in agreement with the care plan formulated and outlined with them. I have encouraged them to ask questions as they arise throughout their visit. Critical Care Time:  
0 Disposition: 
DISCHARGE NOTE 
7:19 PM 
The patient has been re-evaluated and is ready for discharge. Reviewed available results with patient. Counseled patient on diagnosis and care plan. Patient has expressed understanding, and all questions have been answered. Patient agrees with plan and agrees to follow up as recommended, or return to the ED if their symptoms worsen. Discharge instructions have been provided and explained to the patient, along with reasons to return to the ED. PLAN: 
1. Discharge Current Discharge Medication List  
  
START taking these medications Details  
cephALEXin (KEFLEX) 500 mg capsule Take 1 Cap by mouth four (4) times daily for 7 days. Qty: 28 Cap, Refills: 0  
  
trimethoprim-sulfamethoxazole (BACTRIM DS) 160-800 mg per tablet Take 1 Tab by mouth two (2) times a day for 7 days. Qty: 14 Tab, Refills: 0  
  
  
 
2. Follow-up Information Follow up With Specialties Details Why Contact Info [facility] EMERGENCY DEPT Emergency Medicine  If symptoms worsen, if your redness increases,  you develop fever, vomiting or have any other concerns. [address] [facility] 
[phone] Return to ED if worse Diagnosis Clinical Impression: 1. Cellulitis of right lower extremity 2. Pharyngitis, unspecified etiology Attestations: This note is prepared by Domingo Muhammad, acting as Scribe for Bernardo Arzate DO. Bernardo Arzate DO: The scribe's documentation has been prepared under my direction and personally reviewed by me in its entirety. I confirm that the note above accurately reflects all work, treatment, procedures, and medical decision making performed by me. This note will not be viewable in [address]

## 2019-01-26 NOTE — LETTER
[facility] EMERGENCY DEPT 
[address] [place] [address] [address] [id number] [phone] Work/School Note Date: 1/26/2019 To Whom It May concern: 
 
Sheryl Singh was seen and treated today in the emergency room by the following provider(s): 
Attending Provider: Vikram Urrutia DO. Sheryl Singh may return to work on 1/28/19. Sincerely, Oanh Mess, DO

## 2019-01-27 NOTE — DISCHARGE INSTRUCTIONS

## 2019-02-01 LAB
BACTERIA SPEC CULT: NORMAL
BACTERIA SPEC CULT: NORMAL
SERVICE CMNT-IMP: NORMAL
SERVICE CMNT-IMP: NORMAL

## 2019-05-13 ENCOUNTER — OFFICE VISIT (OUTPATIENT)
Dept: URGENT CARE | Age: 24
End: 2019-05-13

## 2019-05-13 VITALS
HEART RATE: 82 BPM | RESPIRATION RATE: 18 BRPM | WEIGHT: 211 LBS | SYSTOLIC BLOOD PRESSURE: 125 MMHG | HEIGHT: 69 IN | TEMPERATURE: 99.4 F | DIASTOLIC BLOOD PRESSURE: 61 MMHG | OXYGEN SATURATION: 99 % | BODY MASS INDEX: 31.25 KG/M2

## 2019-05-13 DIAGNOSIS — M54.50 ACUTE BILATERAL LOW BACK PAIN WITHOUT SCIATICA: ICD-10-CM

## 2019-05-13 DIAGNOSIS — L03.116 CELLULITIS OF LEFT LOWER EXTREMITY: Primary | ICD-10-CM

## 2019-05-13 RX ORDER — NAPROXEN 500 MG/1
500 TABLET ORAL 2 TIMES DAILY WITH MEALS
Qty: 20 TAB | Refills: 0 | Status: SHIPPED | OUTPATIENT
Start: 2019-05-13 | End: 2021-07-07

## 2019-05-13 RX ORDER — METHOCARBAMOL 500 MG/1
500 TABLET, FILM COATED ORAL
Qty: 20 TAB | Refills: 0 | Status: SHIPPED | OUTPATIENT
Start: 2019-05-13 | End: 2021-07-07

## 2019-05-13 RX ORDER — CEPHALEXIN 500 MG/1
500 CAPSULE ORAL 4 TIMES DAILY
Qty: 40 CAP | Refills: 0 | Status: SHIPPED | OUTPATIENT
Start: 2019-05-13 | End: 2019-05-23

## 2019-05-13 NOTE — PATIENT INSTRUCTIONS
Cellulitis: Care Instructions  Your Care Instructions    Cellulitis is a skin infection caused by bacteria, most often strep or staph. It often occurs after a break in the skin from a scrape, cut, bite, or puncture, or after a rash. Cellulitis may be treated without doing tests to find out what caused it. But your doctor may do tests, if needed, to look for a specific bacteria, like methicillin-resistant Staphylococcus aureus (MRSA). The doctor has checked you carefully, but problems can develop later. If you notice any problems or new symptoms, get medical treatment right away. Follow-up care is a key part of your treatment and safety. Be sure to make and go to all appointments, and call your doctor if you are having problems. It's also a good idea to know your test results and keep a list of the medicines you take. How can you care for yourself at home? · Take your antibiotics as directed. Do not stop taking them just because you feel better. You need to take the full course of antibiotics. · Prop up the infected area on pillows to reduce pain and swelling. Try to keep the area above the level of your heart as often as you can. · If your doctor told you how to care for your wound, follow your doctor's instructions. If you did not get instructions, follow this general advice:  ? Wash the wound with clean water 2 times a day. Don't use hydrogen peroxide or alcohol, which can slow healing. ? You may cover the wound with a thin layer of petroleum jelly, such as Vaseline, and a nonstick bandage. ? Apply more petroleum jelly and replace the bandage as needed. · Be safe with medicines. Take pain medicines exactly as directed. ? If the doctor gave you a prescription medicine for pain, take it as prescribed. ? If you are not taking a prescription pain medicine, ask your doctor if you can take an over-the-counter medicine.   To prevent cellulitis in the future  · Try to prevent cuts, scrapes, or other injuries to your skin. Cellulitis most often occurs where there is a break in the skin. · If you get a scrape, cut, mild burn, or bite, wash the wound with clean water as soon as you can to help avoid infection. Don't use hydrogen peroxide or alcohol, which can slow healing. · If you have swelling in your legs (edema), support stockings and good skin care may help prevent leg sores and cellulitis. · Take care of your feet, especially if you have diabetes or other conditions that increase the risk of infection. Wear shoes and socks. Do not go barefoot. If you have athlete's foot or other skin problems on your feet, talk to your doctor about how to treat them. When should you call for help? Call your doctor now or seek immediate medical care if:    · You have signs that your infection is getting worse, such as:  ? Increased pain, swelling, warmth, or redness. ? Red streaks leading from the area. ? Pus draining from the area. ? A fever.     · You get a rash.    Watch closely for changes in your health, and be sure to contact your doctor if:    · You do not get better as expected. Where can you learn more? Go to http://dolores-tyree.info/. Brady Jose in the search box to learn more about \"Cellulitis: Care Instructions. \"  Current as of: April 17, 2018  Content Version: 11.9  © 5353-0291 [employer]. Care instructions adapted under license by [employer] (which disclaims liability or warranty for this information). If you have questions about a medical condition or this instruction, always ask your healthcare professional. [place] [address] any warranty or liability for your use of this information. Low Back Pain: Exercises  Your Care Instructions  Here are some examples of typical rehabilitation exercises for your condition. Start each exercise slowly. Ease off the exercise if you start to have pain.   Your doctor or physical therapist will tell you when you can start these exercises and which ones will work best for you. How to do the exercises  Press-up    1. Lie on your stomach, supporting your body with your forearms. 2. Press your elbows down into the floor to raise your upper back. As you do this, relax your stomach muscles and allow your back to arch without using your back muscles. As your press up, do not let your hips or pelvis come off the floor. 3. Hold for 15 to 30 seconds, then relax. 4. Repeat 2 to 4 times. Alternate arm and leg (bird dog) exercise    1. Start on the floor, on your hands and knees. 2. Tighten your belly muscles. 3. Raise one leg off the floor, and hold it straight out behind you. Be careful not to let your hip drop down, because that will twist your trunk. 4. Hold for about 6 seconds, then lower your leg and switch to the other leg. 5. Repeat 8 to 12 times on each leg. 6. Over time, work up to holding for 10 to 30 seconds each time. 7. If you feel stable and secure with your leg raised, try raising the opposite arm straight out in front of you at the same time. Knee-to-chest exercise    1. Lie on your back with your knees bent and your feet flat on the floor. 2. Bring one knee to your chest, keeping the other foot flat on the floor (or keeping the other leg straight, whichever feels better on your lower back). 3. Keep your lower back pressed to the floor. Hold for at least 15 to 30 seconds. 4. Relax, and lower the knee to the starting position. 5. Repeat with the other leg. Repeat 2 to 4 times with each leg. 6. To get more stretch, put your other leg flat on the floor while pulling your knee to your chest.    Curl-ups    1. Lie on the floor on your back with your knees bent at a 90-degree angle. Your feet should be flat on the floor, about 12 inches from your buttocks.   2. Cross your arms over your chest. If this bothers your neck, try putting your hands behind your neck (not your head), with your elbows spread apart. 3. Slowly tighten your belly muscles and raise your shoulder blades off the floor. 4. Keep your head in line with your body, and do not press your chin to your chest.  5. Hold this position for 1 or 2 seconds, then slowly lower yourself back down to the floor. 6. Repeat 8 to 12 times. Pelvic tilt exercise    1. Lie on your back with your knees bent. 2. \"Brace\" your stomach. This means to tighten your muscles by pulling in and imagining your belly button moving toward your spine. You should feel like your back is pressing to the floor and your hips and pelvis are rocking back. 3. Hold for about 6 seconds while you breathe smoothly. 4. Repeat 8 to 12 times. Heel dig bridging    1. Lie on your back with both knees bent and your ankles bent so that only your heels are digging into the floor. Your knees should be bent about 90 degrees. 2. Then push your heels into the floor, squeeze your buttocks, and lift your hips off the floor until your shoulders, hips, and knees are all in a straight line. 3. Hold for about 6 seconds as you continue to breathe normally, and then slowly lower your hips back down to the floor and rest for up to 10 seconds. 4. Do 8 to 12 repetitions. Hamstring stretch in doorway    1. Lie on your back in a doorway, with one leg through the open door. 2. Slide your leg up the wall to straighten your knee. You should feel a gentle stretch down the back of your leg. 3. Hold the stretch for at least 15 to 30 seconds. Do not arch your back, point your toes, or bend either knee. Keep one heel touching the floor and the other heel touching the wall. 4. Repeat with your other leg. 5. Do 2 to 4 times for each leg. Hip flexor stretch    1. Kneel on the floor with one knee bent and one leg behind you. Place your forward knee over your foot. Keep your other knee touching the floor.   2. Slowly push your hips forward until you feel a stretch in the upper thigh of your rear leg. 3. Hold the stretch for at least 15 to 30 seconds. Repeat with your other leg. 4. Do 2 to 4 times on each side. Wall sit    1. Stand with your back 10 to 12 inches away from a wall. 2. Lean into the wall until your back is flat against it. 3. Slowly slide down until your knees are slightly bent, pressing your lower back into the wall. 4. Hold for about 6 seconds, then slide back up the wall. 5. Repeat 8 to 12 times. Follow-up care is a key part of your treatment and safety. Be sure to make and go to all appointments, and call your doctor if you are having problems. It's also a good idea to know your test results and keep a list of the medicines you take. Where can you learn more? Go to http://dolores-tyree.info/. Enter L985 in the search box to learn more about \"Low Back Pain: Exercises. \"  Current as of: September 20, 2018  Content Version: 11.9  © 5042-1693 [employer]. Care instructions adapted under license by [employer] (which disclaims liability or warranty for this information). If you have questions about a medical condition or this instruction, always ask your healthcare professional. [address] any warranty or liability for your use of this information.

## 2019-05-13 NOTE — PROGRESS NOTES
Skin Problem   This is a new problem. The current episode started more than 2 days ago. The problem occurs constantly. The problem has been rapidly worsening. Associated symptoms comments: Redness and swelling with pain on left lower leg-   Has tattoo done 10 days before  . Nothing relieves the symptoms. Treatments tried: topical antibiotics. Past Medical History:   Diagnosis Date    Acne 10/3/2011    Infectious mononucleosis 05/2010    Urticaria 04/2010        History reviewed. No pertinent surgical history. History reviewed. No pertinent family history.      Social History     Socioeconomic History    Marital status: SINGLE     Spouse name: Not on file    Number of children: Not on file    Years of education: Not on file    Highest education level: Not on file   Occupational History    Not on file   Social Needs    Financial resource strain: Not on file    Food insecurity:     Worry: Not on file     Inability: Not on file    Transportation needs:     Medical: Not on file     Non-medical: Not on file   Tobacco Use    Smoking status: Former Smoker    Smokeless tobacco: Never Used   Substance and Sexual Activity    Alcohol use: No    Drug use: Yes     Comment: occassional weed     Sexual activity: Yes   Lifestyle    Physical activity:     Days per week: Not on file     Minutes per session: Not on file    Stress: Not on file   Relationships    Social connections:     Talks on phone: Not on file     Gets together: Not on file     Attends [employer] service: Not on file     Active member of club or organization: Not on file     Attends meetings of clubs or organizations: Not on file     Relationship status: Not on file    Intimate partner violence:     Fear of current or ex partner: Not on file     Emotionally abused: Not on file     Physically abused: Not on file     Forced sexual activity: Not on file   Other Topics Concern    Not on file   Social History Narrative    Not on file ALLERGIES: Tetracycline    Review of Systems   Musculoskeletal: Positive for back pain. H/o recurrent low back pain  H/o standing and frequent bending/lifting at work    Gain weight in past 1 year 100 lb    All other systems reviewed and are negative. Vitals:    05/13/19 1243   BP: 125/61   Pulse: 82   Resp: 18   Temp: 99.4 °F (37.4 °C)   SpO2: 99%   Weight: 211 lb (95.7 kg)   Height: 5' 9\" (1.753 m)       Physical Exam   Musculoskeletal:        Left lower leg: She exhibits tenderness and swelling (with erythema @ site of tattoo). Legs:  Nursing note and vitals reviewed. MDM    Procedures      ICD-10-CM ICD-9-CM    1. Cellulitis of left lower extremity L03.116 682.6     at site of tatoo( placed 2 weeks brfore) on inner lower leg    2. Acute bilateral low back pain without sciatica M54.5 724.2      338.19      Medications Ordered Today   Medications    methocarbamol (ROBAXIN) 500 mg tablet     Sig: Take 1 Tab by mouth two (2) times daily as needed for Pain. Dispense:  20 Tab     Refill:  0    naproxen (NAPROSYN) 500 mg tablet     Sig: Take 1 Tab by mouth two (2) times daily (with meals). Dispense:  20 Tab     Refill:  0    cephALEXin (KEFLEX) 500 mg capsule     Sig: Take 1 Cap by mouth four (4) times daily for 10 days. Dispense:  40 Cap     Refill:  0     No results found for any visits on 05/13/19. The patients condition was discussed with the patient and they understand. The patient is to follow up with primary care doctor. If signs and symptoms become worse the pt is to go to the ER. The patient is to take medications as prescribed.

## 2019-07-25 ENCOUNTER — OFFICE VISIT (OUTPATIENT)
Dept: URGENT CARE | Age: 24
End: 2019-07-25

## 2019-07-25 VITALS
HEART RATE: 70 BPM | TEMPERATURE: 98.1 F | RESPIRATION RATE: 18 BRPM | HEIGHT: 69 IN | SYSTOLIC BLOOD PRESSURE: 114 MMHG | WEIGHT: 214 LBS | DIASTOLIC BLOOD PRESSURE: 66 MMHG | BODY MASS INDEX: 31.7 KG/M2 | OXYGEN SATURATION: 99 %

## 2019-07-25 DIAGNOSIS — J30.9 ALLERGIC RHINITIS, UNSPECIFIED SEASONALITY, UNSPECIFIED TRIGGER: Primary | ICD-10-CM

## 2019-07-25 RX ORDER — LEVOCETIRIZINE DIHYDROCHLORIDE 5 MG/1
5 TABLET, FILM COATED ORAL DAILY
Qty: 30 TAB | Refills: 0 | Status: SHIPPED | OUTPATIENT
Start: 2019-07-25 | End: 2021-07-07

## 2019-07-25 RX ORDER — FLUTICASONE PROPIONATE 50 MCG
2 SPRAY, SUSPENSION (ML) NASAL DAILY
Qty: 1 BOTTLE | Refills: 0 | Status: SHIPPED | OUTPATIENT
Start: 2019-07-25 | End: 2021-07-07

## 2019-07-25 NOTE — PROGRESS NOTES
The history is provided by the patient. Cold Symptoms   The history is provided by the patient. Episode onset:   3 days ago. The problem has not changed since onset. The cough is non-productive. There has been no fever. Associated symptoms include rhinorrhea and sore throat. Pertinent negatives include no chest pain, no chills, no headaches, no nausea and no vomiting. She has tried cough syrup for the symptoms. The treatment provided no (Patient has taken Tylenol to help relief symptoms) relief. Past Medical History:   Diagnosis Date    Acne 10/3/2011    Infectious mononucleosis 05/2010    Urticaria 04/2010        History reviewed. No pertinent surgical history. History reviewed. No pertinent family history.      Social History     Socioeconomic History    Marital status: SINGLE     Spouse name: Not on file    Number of children: Not on file    Years of education: Not on file    Highest education level: Not on file   Occupational History    Not on file   Social Needs    Financial resource strain: Not on file    Food insecurity:     Worry: Not on file     Inability: Not on file    Transportation needs:     Medical: Not on file     Non-medical: Not on file   Tobacco Use    Smoking status: Former Smoker    Smokeless tobacco: Never Used   Substance and Sexual Activity    Alcohol use: No    Drug use: Yes     Comment: occassional weed     Sexual activity: Yes   Lifestyle    Physical activity:     Days per week: Not on file     Minutes per session: Not on file    Stress: Not on file   Relationships    Social connections:     Talks on phone: Not on file     Gets together: Not on file     Attends [employer] service: Not on file     Active member of club or organization: Not on file     Attends meetings of clubs or organizations: Not on file     Relationship status: Not on file    Intimate partner violence:     Fear of current or ex partner: Not on file     Emotionally abused: Not on file Physically abused: Not on file     Forced sexual activity: Not on file   Other Topics Concern    Not on file   Social History Narrative    Not on file                ALLERGIES: Tetracycline    Review of Systems   Constitutional: Negative for chills, fatigue and fever. HENT: Positive for congestion, rhinorrhea, sinus pressure and sore throat. Negative for sinus pain. Eyes: Negative. Cardiovascular: Negative for chest pain. Gastrointestinal: Negative for abdominal distention, abdominal pain, diarrhea, nausea and vomiting. Skin: Negative. Neurological: Negative for dizziness, light-headedness, numbness and headaches. Vitals:    19 1231   BP: 114/66   Pulse: 70   Resp: 18   Temp: 98.1 °F (36.7 °C)   SpO2: 99%   Weight: 214 lb (97.1 kg)   Height: 5' 9\" (1.753 m)       Physical Exam   Constitutional: She is oriented to person, place, and time. She appears well-developed and well-nourished. HENT:   Right Ear: External ear normal.   Left Ear: External ear normal.   Post nasal drainage   Neck: Normal range of motion. Neck supple. Cardiovascular: Normal rate, regular rhythm and normal heart sounds. Pulmonary/Chest: Effort normal and breath sounds normal. No respiratory distress. She has no wheezes. She has no rales. Abdominal: Soft. Bowel sounds are normal.   Musculoskeletal: Normal range of motion. Lymphadenopathy:     She has no cervical adenopathy. Neurological: She is alert and oriented to person, place, and time. Skin: Skin is warm and dry. Psychiatric: She has a normal mood and affect. Nursing note and vitals reviewed. MDM     Differential Diagnosis; Clinical Impression; Plan:     (J30.9) Allergic rhinitis, unspecified seasonality, unspecified trigger  (primary encounter diagnosis)  Orders Placed This Encounter      fluticasone propionate (FLONASE) 50 mcg/actuation nasal spray          Si Sprays by Both Nostrils route daily.           Dispense:  1 Bottle Refill:  0      levocetirizine (XYZAL) 5 mg tablet          Sig: Take 1 Tab by mouth daily. Dispense:  30 Tab          Refill:  0    The patients condition was discussed with the patient and they understand. The patient is to follow up with PCP. If signs and symptoms become worse the pt is to go to the ER. The patient is to take medications as prescribed. AVS given with patient instructions upon discharge.                   Procedures

## 2019-07-25 NOTE — PATIENT INSTRUCTIONS
Upper Respiratory Infection: After Your Visit to the Emergency Room  Your Care Instructions  You were seen in the emergency room for an upper respiratory infection (URI). This is an infection of the nose, sinuses, or throat. Viruses or bacteria can cause URIs. Colds, flu, and sinusitis are examples of URIs. These infections are spread by coughs, sneezes, and close contact with people who have a URI. Your doctor may have given you antibiotics to treat the infection if it was caused by bacteria. But antibiotics will not help a viral infection. You can treat most infections with home care. This may include drinking lots of fluids and taking over-the-counter medicine for your symptoms. Even though you have been released from the emergency room, you still need to watch for any problems. The doctor carefully checked you. But sometimes problems can develop later. If you have new symptoms, or if your symptoms do not get better, return to the emergency room or call your doctor right away. A visit to the emergency room is only one step in your treatment. Even if you feel better, you still need to do what your doctor recommends, such as going to all suggested follow-up appointments and taking medicines exactly as directed. This will help you recover and help prevent future problems. How can you care for yourself at home? · To prevent dehydration, drink plenty of fluids, enough so that your urine is light yellow or clear like water. Choose water and other caffeine-free clear liquids until you feel better. If you have kidney, heart, or liver disease and have to limit fluids, talk with your doctor before you increase the amount of fluids you drink. · Take acetaminophen (Tylenol) or ibuprofen (Advil, Motrin) for fever or pain. Read and follow all instructions on the label. · If your doctor prescribed antibiotics, take them as directed. Do not stop taking them just because you feel better.  You need to take the full course of antibiotics. · Take cough medicine and a decongestant if your doctor suggests it. · Get plenty of rest.  · Use saline (saltwater) nasal washes to help keep your nasal passages open and wash out mucus and bacteria. You can buy saline nose drops at a grocery store or drugstore. Or you can make your own at home by adding 1 teaspoon of salt and 1 teaspoon of baking soda to 2 cups of distilled water. If you make your own, fill a bulb syringe with the solution, insert the tip into your nostril, and squeeze gently. Krystian Eleni your nose. · Use a vaporizer or humidifier to add moisture to the air in your bedroom. Follow the instructions for cleaning it. · Do not smoke or allow others to smoke around you. If you need help quitting, talk to your doctor about stop-smoking programs and medicines. These can increase your chances of quitting for good. When should you call for help? Call 911 if:  · You have severe trouble breathing. Return to the emergency room now if:  · You have a fever with stiff neck or a severe headache. · You have signs of needing more fluids. You have sunken eyes, a dry mouth, and pass only a little dark urine. · You cannot keep down fluids or medicine. Call your doctor today if:  · You have a deep cough and a lot of mucus. · You are too tired to eat or drink. · You have a new symptom, such as a sore throat, an earache, or a rash. Where can you learn more? Go to [employer]  Enter U031 in the search box to learn more about \"Upper Respiratory Infection: After Your Visit to the Emergency Room. \"   © 1027-8678 Healthwise, Incorporated. Care instructions adapted under license by [facility] [employer] (which disclaims liability or warranty for this information).  This care instruction is for use with your licensed healthcare professional. If you have questions about a medical condition or this instruction, always ask your healthcare professional. [address]  any warranty or liability for your use of this information.   Content Version: 9.3.61078; Last Revised: February 13, 2012      Instructions:    Over the counter Sudafed  Use humidifier at night  Warm cloth to face for relief  Tylenol for discomfort

## 2021-06-28 ENCOUNTER — OFFICE VISIT (OUTPATIENT)
Dept: URGENT CARE | Age: 26
End: 2021-06-28
Payer: COMMERCIAL

## 2021-06-28 VITALS — TEMPERATURE: 98.6 F | HEART RATE: 114 BPM | OXYGEN SATURATION: 97 % | RESPIRATION RATE: 17 BRPM

## 2021-06-28 DIAGNOSIS — R53.83 FATIGUE, UNSPECIFIED TYPE: Primary | ICD-10-CM

## 2021-06-28 LAB — SARS-COV-2 POC: NEGATIVE

## 2021-06-28 PROCEDURE — 87426 SARSCOV CORONAVIRUS AG IA: CPT | Performed by: INTERNAL MEDICINE

## 2021-06-28 PROCEDURE — 99212 OFFICE O/P EST SF 10 MIN: CPT | Performed by: INTERNAL MEDICINE

## 2021-06-29 NOTE — PROGRESS NOTES
HISTORY OF PRESENT ILLNESS  Keke Castellanos is a 22 y.o. female. Patient reports that she had an exposure to [employer] on Friday. Reports as of today she has had some fatigue and chills. Reports no cough, SOB, congestion, or GI concerns. Needs both test to return back to work. Visit Vitals  Pulse (!) 114   Temp 98.6 °F (37 °C)   Resp 17   LMP 06/07/2021   SpO2 97%     Outpatient Encounter Medications as of 6/28/2021   Medication Sig Dispense Refill    etonogestrel (NEXPLANON) 68 mg impl by SubDERmal route.  fluticasone propionate (FLONASE) 50 mcg/actuation nasal spray 2 Sprays by Both Nostrils route daily. (Patient not taking: Reported on 6/28/2021) 1 Bottle 0    levocetirizine (XYZAL) 5 mg tablet Take 1 Tab by mouth daily. (Patient not taking: Reported on 6/28/2021) 30 Tab 0    methocarbamol (ROBAXIN) 500 mg tablet Take 1 Tab by mouth two (2) times daily as needed for Pain. (Patient not taking: Reported on 6/28/2021) 20 Tab 0    naproxen (NAPROSYN) 500 mg tablet Take 1 Tab by mouth two (2) times daily (with meals). (Patient not taking: Reported on 6/28/2021) 20 Tab 0    melatonin 3 mg tablet Take 1 Tab by mouth nightly. (Patient not taking: Reported on 6/28/2021) 30 Tab 0     No facility-administered encounter medications on file as of 6/28/2021. HPI    Review of Systems   Constitutional: Positive for chills and malaise/fatigue. Negative for fever. HENT: Negative. Respiratory: Negative. Cardiovascular: Negative. Gastrointestinal: Negative. Musculoskeletal: Negative. Neurological: Negative. Physical Exam  Vitals and nursing note reviewed. HENT:      Head: Normocephalic. Nose: Nose normal.      Mouth/Throat:      Pharynx: Oropharynx is clear. Cardiovascular:      Rate and Rhythm: Normal rate and regular rhythm. Pulmonary:      Effort: Pulmonary effort is normal.      Breath sounds: Normal breath sounds. Abdominal:      Palpations: Abdomen is soft.    Skin: General: Skin is warm. Neurological:      Mental Status: She is alert and oriented to person, place, and time. ASSESSMENT and PLAN  Diagnoses and all orders for this visit:    1.  Fatigue, unspecified type  -     NOVEL CORONAVIRUS (COVID-19)  -     AMB POC SARS-COV-2          Follow-up and Dispositions    · Return if symptoms worsen or fail to improve, for Follow up.       lab results and schedule of future lab studies reviewed with patient  reviewed medications and side effects in detail

## 2021-07-07 ENCOUNTER — OFFICE VISIT (OUTPATIENT)
Dept: URGENT CARE | Age: 26
End: 2021-07-07
Payer: COMMERCIAL

## 2021-07-07 VITALS — RESPIRATION RATE: 16 BRPM | OXYGEN SATURATION: 99 % | HEART RATE: 92 BPM | TEMPERATURE: 98.4 F

## 2021-07-07 DIAGNOSIS — Z20.822 SUSPECTED COVID-19 VIRUS INFECTION: Primary | ICD-10-CM

## 2021-07-07 DIAGNOSIS — Z20.822 EXPOSURE TO COVID-19 VIRUS: ICD-10-CM

## 2021-07-07 LAB — SARS-COV-2 POC: POSITIVE

## 2021-07-07 PROCEDURE — 99213 OFFICE O/P EST LOW 20 MIN: CPT | Performed by: NURSE PRACTITIONER

## 2021-07-07 PROCEDURE — 87426 SARSCOV CORONAVIRUS AG IA: CPT | Performed by: NURSE PRACTITIONER

## 2021-07-07 RX ORDER — NORETHINDRONE ACETATE AND ETHINYL ESTRADIOL 1; .02 MG/1; MG/1
1 TABLET ORAL DAILY
COMMUNITY

## 2021-07-07 NOTE — LETTER
July 7, 2021  Barbra Spear [place] [id number]    Dear Mel Browning:  Thank you for requesting access to [employer]. Please follow the instructions below to securely access and download your online medical record. [employer] allows you to send messages to your doctor, view your test results, renew your prescriptions, schedule appointments, and more. How Do I Sign Up? 1. In your internet browser, go to [email or web address]. 2. Click on the First Time User? Click Here link in the Sign In box. You will see the New Member Sign Up page. 3. Enter your [employer] Access Code exactly as it appears below. You will not need to use this code after youve completed the sign-up process. If you do not sign up before the expiration date, you must request a new code. [employer] Access Code: [email or web address]  Expires: 8/12/2021  7:48 PM     4. Enter the last four digits of your Social Security Number (xxxx) and Date of Birth (mm/dd/yyyy) as indicated and click Submit. You will be taken to the next sign-up page. 5. Create a [employer] ID. This will be your [employer] [facility] login ID and cannot be changed, so think of one that is secure and easy to remember. 6. Create a [employer] password. You can change your password at any time. 7. Enter your Password Reset Question and Answer. This can be used at a later time if you forget your password. 8. Enter your e-mail address. You will receive e-mail notification when new information is available in [address]. 9. Click Sign Up. You can now view and download portions of your medical record. 10. Click the Download Summary menu link to download a portable copy of your medical information. Additional Information    If you have questions, please visit the Frequently Asked Questions section of the [employer] website at [email or web address] Remember, [employer] is NOT to be used for urgent needs. For medical emergencies, dial 911.     Now available from your iPhone and Android!     Sincerely,   [employer]

## 2021-07-07 NOTE — PROGRESS NOTES
Subjective: (As above and below)       This patient was seen in Flu Clinic at [address] [place] [address] Urgent Care while outdoors at their vehicle due to COVID-19 pandemic with PPE and focused examination in order to decrease community viral transmission. The patient/guardian gave verbal consent to treat. Chief Complaint   Patient presents with    Other     possible covid     Shona Gomez is a 22 y.o. female who presents for evaluation of : possible covid; reporting close to 10 day ago had new onset of fatigue, loss of smell, fever, aches cough, runny nose. She took tylenol and has stayed hydrated. She has known close contact with positive covid 19 individual who is here with her in car today. Overall she promotes she is feeling much better but requesting testing prior to seeing family. Denies any recent fevers within past 3 days. No active SOB or chest pains. Known Exposure to COVID-19: YES  Known flu or strep contact: no       Review of Systems - negative except as listed above    Reviewed PmHx, RxHx, FmHx, SocHx, AllgHx and updated in chart. History reviewed. No pertinent family history.     Past Medical History:   Diagnosis Date    Acne 10/3/2011    Infectious mononucleosis 05/2010    Urticaria 04/2010      Social History     Socioeconomic History    Marital status: SINGLE     Spouse name: Not on file    Number of children: Not on file    Years of education: Not on file    Highest education level: Not on file   Tobacco Use    Smoking status: Current Every Day Smoker     Packs/day: 1.00    Smokeless tobacco: Never Used   Substance and Sexual Activity    Alcohol use: No    Drug use: Yes     Comment: occassional weed     Sexual activity: Yes     Social Determinants of Health     Financial Resource Strain:     Difficulty of Paying Living Expenses:    Food Insecurity:     Worried About Running Out of Food in the Last Year:     [address] in the Last Year:    Transportation Needs:  Lack of Transportation (Medical):  Lack of Transportation (Non-Medical):    Physical Activity:     Days of Exercise per Week:     Minutes of Exercise per Session:    Stress:     Feeling of Stress :    Social Connections:     Frequency of Communication with Friends and Family:     Frequency of Social Gatherings with Friends and Family:     Attends [employer] Services:     Active Member of Clubs or Organizations:     Attends Club or Organization Meetings:     Marital Status:           Current Outpatient Medications   Medication Sig    norethindrone-ethinyl estradiol (Junel 1/20, 21,) 1-20 mg-mcg tablet Take 1 Tablet by mouth daily. No current facility-administered medications for this visit. Objective:     Vitals:    07/07/21 1450   Pulse: 92   Resp: 16   Temp: 98.4 °F (36.9 °C)   SpO2: 99%       Physical Exam  General appearance  appears well hydrated and does not appear toxic, no acute distress  Eyes - EOMs intact. Non injected. No scleral icterus   Ears - no external swelling  Nose -  No purulent drainage  Mouth - OP clear without swelling, exudate or lesion. Mucus membranes moist. Uvula midline. Neck/Lymphatics  trachea midline, full AROM  Chest - Normal breathing effort no wheeze rales, rhonchi or diminishments bilaterally. Heart - RRR, no murmurs  Skin - no observable rashes or pallor  Neurologic- alert and oriented x 3  Psychiatric- normal mood, behavior and though content. Assessment/ Plan:     1. Suspected COVID-19 virus infection    - AMB POC SARS-COV-2  - NOVEL CORONAVIRUS (COVID-19)    2. Exposure to COVID-19 virus    - AMB POC SARS-COV-2  - NOVEL CORONAVIRUS (COVID-19)      No evidence suggesting complication of illness and self reports improvement at this time. Will discharge home with close monitoring and follow up.   Rapid COVID 19 test POSITIVE  Continue home care for mild symptoms advised- maintain adequate fluid intake, lozenges, over the counter Tylenol (for fever, aches, pains, chills), deep breathing exercises  Advised CDC based quarantine guidelines. Follow up: We have reviewed worsening/concerning signs and symptoms that may warrant seeking immediate care in the ED setting. For other non-severe changes, non-improvement or questions, patient aware to contact PCP office or consider a virtual online medical consultation.         Ruth Saldaña, NP

## 2023-05-10 RX ORDER — NORETHINDRONE ACETATE AND ETHINYL ESTRADIOL 1; .02 MG/1; MG/1
1 TABLET ORAL DAILY
COMMUNITY

## 2024-07-18 ENCOUNTER — HOSPITAL ENCOUNTER (EMERGENCY)
Facility: HOSPITAL | Age: 29
Discharge: HOME | End: 2024-07-18
Attending: EMERGENCY MEDICINE
Payer: COMMERCIAL

## 2024-07-18 VITALS
BODY MASS INDEX: 28.02 KG/M2 | SYSTOLIC BLOOD PRESSURE: 137 MMHG | HEIGHT: 69 IN | RESPIRATION RATE: 25 BRPM | WEIGHT: 189.15 LBS | OXYGEN SATURATION: 96 % | DIASTOLIC BLOOD PRESSURE: 82 MMHG | HEART RATE: 96 BPM | TEMPERATURE: 98.8 F

## 2024-07-18 DIAGNOSIS — J02.9 ACUTE PHARYNGITIS, UNSPECIFIED ETIOLOGY: Primary | ICD-10-CM

## 2024-07-18 LAB
DEPRECATED S PYO AG THROAT QL EIA: NEGATIVE
FLUAV RNA SPEC QL NAA+PROBE: NOT DETECTED
FLUBV RNA SPEC QL NAA+PROBE: NOT DETECTED
SARS-COV-2 RNA RESP QL NAA+PROBE: NOT DETECTED

## 2024-07-18 PROCEDURE — 99283 EMERGENCY DEPT VISIT LOW MDM: CPT

## 2024-07-18 PROCEDURE — 87147 CULTURE TYPE IMMUNOLOGIC: CPT

## 2024-07-18 PROCEDURE — 6370000000 HC RX 637 (ALT 250 FOR IP): Performed by: EMERGENCY MEDICINE

## 2024-07-18 PROCEDURE — 87070 CULTURE OTHR SPECIMN AEROBIC: CPT

## 2024-07-18 PROCEDURE — 87880 STREP A ASSAY W/OPTIC: CPT

## 2024-07-18 PROCEDURE — 6360000002 HC RX W HCPCS: Performed by: EMERGENCY MEDICINE

## 2024-07-18 PROCEDURE — 87636 SARSCOV2 & INF A&B AMP PRB: CPT

## 2024-07-18 RX ORDER — LIDOCAINE HYDROCHLORIDE 20 MG/ML
15 SOLUTION OROPHARYNGEAL
Qty: 1 EACH | Refills: 0 | Status: SHIPPED | OUTPATIENT
Start: 2024-07-18 | End: 2024-07-21

## 2024-07-18 RX ORDER — IBUPROFEN 400 MG/1
800 TABLET ORAL
Status: COMPLETED | OUTPATIENT
Start: 2024-07-18 | End: 2024-07-18

## 2024-07-18 RX ORDER — IBUPROFEN 600 MG/1
600 TABLET ORAL 4 TIMES DAILY PRN
Qty: 30 TABLET | Refills: 0 | Status: SHIPPED | OUTPATIENT
Start: 2024-07-18

## 2024-07-18 RX ORDER — LIDOCAINE HYDROCHLORIDE 20 MG/ML
15 SOLUTION OROPHARYNGEAL
Status: COMPLETED | OUTPATIENT
Start: 2024-07-18 | End: 2024-07-18

## 2024-07-18 RX ORDER — DEXAMETHASONE SODIUM PHOSPHATE 10 MG/ML
10 INJECTION, SOLUTION INTRAMUSCULAR; INTRAVENOUS ONCE
Status: COMPLETED | OUTPATIENT
Start: 2024-07-18 | End: 2024-07-18

## 2024-07-18 RX ORDER — ACETAMINOPHEN 500 MG
1000 TABLET ORAL
Status: COMPLETED | OUTPATIENT
Start: 2024-07-18 | End: 2024-07-18

## 2024-07-18 RX ADMIN — LIDOCAINE HYDROCHLORIDE 15 ML: 20 SOLUTION ORAL at 03:09

## 2024-07-18 RX ADMIN — IBUPROFEN 800 MG: 400 TABLET, FILM COATED ORAL at 03:07

## 2024-07-18 RX ADMIN — DEXAMETHASONE SODIUM PHOSPHATE 10 MG: 10 INJECTION, SOLUTION INTRAMUSCULAR; INTRAVENOUS at 03:07

## 2024-07-18 RX ADMIN — ACETAMINOPHEN 1000 MG: 500 TABLET ORAL at 03:06

## 2024-07-18 ASSESSMENT — PAIN DESCRIPTION - ORIENTATION
ORIENTATION: POSTERIOR
ORIENTATION: POSTERIOR

## 2024-07-18 ASSESSMENT — PAIN DESCRIPTION - DESCRIPTORS
DESCRIPTORS: DULL;ACHING;THROBBING
DESCRIPTORS: DULL;ACHING;THROBBING
DESCRIPTORS: SHARP

## 2024-07-18 ASSESSMENT — PAIN - FUNCTIONAL ASSESSMENT: PAIN_FUNCTIONAL_ASSESSMENT: 0-10

## 2024-07-18 ASSESSMENT — PAIN DESCRIPTION - LOCATION
LOCATION: THROAT

## 2024-07-18 ASSESSMENT — PAIN SCALES - GENERAL
PAINLEVEL_OUTOF10: 9
PAINLEVEL_OUTOF10: 8
PAINLEVEL_OUTOF10: 3

## 2024-07-18 ASSESSMENT — PAIN DESCRIPTION - ONSET: ONSET: PROGRESSIVE

## 2024-07-18 ASSESSMENT — LIFESTYLE VARIABLES
HOW OFTEN DO YOU HAVE A DRINK CONTAINING ALCOHOL: 2-3 TIMES A WEEK
HOW MANY STANDARD DRINKS CONTAINING ALCOHOL DO YOU HAVE ON A TYPICAL DAY: 1 OR 2

## 2024-07-18 ASSESSMENT — PAIN DESCRIPTION - PAIN TYPE: TYPE: ACUTE PAIN

## 2024-07-18 ASSESSMENT — PAIN DESCRIPTION - FREQUENCY: FREQUENCY: CONTINUOUS

## 2024-07-18 NOTE — ED PROVIDER NOTES
[facility] EMERGENCY DEPT  EMERGENCY DEPARTMENT ENCOUNTER         Pt Name: Aleksandra Carmona  MRN: [id number]  Birthdate 1995  Date of evaluation: 7/18/2024  Provider: Marquita Jenkins MD   PCP: No primary care provider on file.  Note Started: 3:41 AM 7/18/24     CHIEF COMPLAINT       Chief Complaint   Patient presents with    Fever     Patient reports a fever and sore throat for the last two weeks. She was seen for this before at an Urgent Care and was diagnosed tonsillitis and sent her home with Amoxicillin. She took it for a week but the symptoms never subsided, the pain in her throat got worse, and she woke up feeling short of breath. She made an appointment with an ENT but they can't see her until August 28th. Patient reports it feeling like she's swallowing something sharp. Denies analgesics since 2000 that provided no relief.        HISTORY OF PRESENT ILLNESS: 1 or more elements      History From: {SAHPI:62906}  HPI Limitations: {HPI Limitations (Optional):72292}     Aleksandra Carmona is a 28 y.o. female who presents ***  Sp course of amox  Tested neg for strep  Has large tonsils and gegtting surgery to remove them next month    Finished amox on Tuesday  It never helped      Please see more comprehensive history below under MDM  Nursing Notes were all reviewed in real time as they are made available. Any disagreements addressed in the HPI/MDM.     REVIEW OF SYSTEMS      Review of Systems     Positives and Pertinent negatives as per HPI and MDM.    PAST HISTORY     Past Medical History:  Past Medical History:   Diagnosis Date    Acne 10/3/2011    Infectious mononucleosis 05/2010    Urticaria 04/2010       Past Surgical History:  History reviewed. No pertinent surgical history.    Family History:  History reviewed. No pertinent family history.    Social History:  Social History     Tobacco Use    Smoking status: Every Day     Current packs/day: 1.00     Types: Cigarettes    Smokeless tobacco: Never   Substance Use

## 2024-07-18 NOTE — ED NOTES
Patient discharged from the ED by MD Gregory. Diagnosis, medications, precautions and follow-ups were reviewed with the patient/family. Questions were asked and answered prior to departure. Patient departed the ED via walking and was accompanied by family to car home.

## 2024-07-18 NOTE — DISCHARGE INSTRUCTIONS
It was a pleasure taking care of you in our Emergency Department today.  We know that when you come to [facility], you are entrusting us with your health, comfort, and safety.  Our physicians and nurses honor that trust, and truly appreciate the opportunity to care for you and your loved ones.    We also value your feedback.  If you receive a survey about your Emergency Department experience today, please fill it out.  We care about our patients' feedback, and we listen to what you have to say.  Thank you!       --- Dr. Marquita Jenkins MD

## 2024-07-19 LAB
BACTERIA SPEC CULT: ABNORMAL
BACTERIA SPEC CULT: ABNORMAL
SERVICE CMNT-IMP: ABNORMAL

## 2024-07-29 ASSESSMENT — ENCOUNTER SYMPTOMS
NAUSEA: 0
BACK PAIN: 0
COUGH: 0
COLOR CHANGE: 0
TROUBLE SWALLOWING: 0
ABDOMINAL PAIN: 0
VOMITING: 0
SORE THROAT: 1
RHINORRHEA: 0
SHORTNESS OF BREATH: 0
DIARRHEA: 0
SINUS PRESSURE: 0
VOICE CHANGE: 0
FACIAL SWELLING: 0